# Patient Record
Sex: MALE | Race: WHITE | NOT HISPANIC OR LATINO | Employment: OTHER | URBAN - METROPOLITAN AREA
[De-identification: names, ages, dates, MRNs, and addresses within clinical notes are randomized per-mention and may not be internally consistent; named-entity substitution may affect disease eponyms.]

---

## 2017-03-17 ENCOUNTER — COMMUNICATION - HEALTHEAST (OUTPATIENT)
Dept: SURGERY | Facility: CLINIC | Age: 37
End: 2017-03-17

## 2017-04-07 ENCOUNTER — OFFICE VISIT - HEALTHEAST (OUTPATIENT)
Dept: SURGERY | Facility: CLINIC | Age: 37
End: 2017-04-07

## 2017-04-07 DIAGNOSIS — G89.18 POST-OP PAIN: ICD-10-CM

## 2017-04-07 ASSESSMENT — MIFFLIN-ST. JEOR: SCORE: 2010.78

## 2017-06-01 ENCOUNTER — COMMUNICATION - HEALTHEAST (OUTPATIENT)
Dept: INTERNAL MEDICINE | Facility: CLINIC | Age: 37
End: 2017-06-01

## 2017-06-06 ENCOUNTER — OFFICE VISIT - HEALTHEAST (OUTPATIENT)
Dept: INTERNAL MEDICINE | Facility: CLINIC | Age: 37
End: 2017-06-06

## 2017-06-06 DIAGNOSIS — Z01.818 PREOP EXAM FOR INTERNAL MEDICINE: ICD-10-CM

## 2017-06-06 DIAGNOSIS — R79.89 ABNORMAL LFTS: ICD-10-CM

## 2017-06-06 ASSESSMENT — MIFFLIN-ST. JEOR: SCORE: 2000.13

## 2017-06-07 ENCOUNTER — ANESTHESIA - HEALTHEAST (OUTPATIENT)
Dept: SURGERY | Facility: HOSPITAL | Age: 37
End: 2017-06-07

## 2017-06-07 LAB — HCV AB SERPL QL IA: NEGATIVE

## 2017-06-08 ENCOUNTER — SURGERY - HEALTHEAST (OUTPATIENT)
Dept: SURGERY | Facility: HOSPITAL | Age: 37
End: 2017-06-08

## 2017-07-03 ENCOUNTER — OFFICE VISIT - HEALTHEAST (OUTPATIENT)
Dept: SURGERY | Facility: CLINIC | Age: 37
End: 2017-07-03

## 2017-07-03 DIAGNOSIS — R10.31 RIGHT LOWER QUADRANT ABDOMINAL PAIN: ICD-10-CM

## 2017-07-20 ENCOUNTER — OFFICE VISIT - HEALTHEAST (OUTPATIENT)
Dept: SURGERY | Facility: CLINIC | Age: 37
End: 2017-07-20

## 2017-07-24 ENCOUNTER — RECORDS - HEALTHEAST (OUTPATIENT)
Dept: ADMINISTRATIVE | Facility: OTHER | Age: 37
End: 2017-07-24

## 2018-05-02 ENCOUNTER — OFFICE VISIT - HEALTHEAST (OUTPATIENT)
Dept: INTERNAL MEDICINE | Facility: CLINIC | Age: 38
End: 2018-05-02

## 2018-05-02 DIAGNOSIS — R10.33 PERIUMBILICAL PAIN: ICD-10-CM

## 2018-05-02 ASSESSMENT — MIFFLIN-ST. JEOR: SCORE: 2057.73

## 2018-05-07 ENCOUNTER — COMMUNICATION - HEALTHEAST (OUTPATIENT)
Dept: UROLOGY | Facility: CLINIC | Age: 38
End: 2018-05-07

## 2018-05-15 ENCOUNTER — COMMUNICATION - HEALTHEAST (OUTPATIENT)
Dept: UROLOGY | Facility: CLINIC | Age: 38
End: 2018-05-15

## 2019-02-22 ENCOUNTER — RECORDS - HEALTHEAST (OUTPATIENT)
Dept: ADMINISTRATIVE | Facility: OTHER | Age: 39
End: 2019-02-22

## 2019-02-22 ENCOUNTER — HOSPITAL ENCOUNTER (OUTPATIENT)
Dept: ULTRASOUND IMAGING | Facility: CLINIC | Age: 39
Discharge: HOME | End: 2019-02-22

## 2019-02-22 DIAGNOSIS — N50.9 DISORDER OF MALE GENITAL ORGANS, UNSPECIFIED: ICD-10-CM

## 2019-02-25 ENCOUNTER — RECORDS - HEALTHEAST (OUTPATIENT)
Dept: ADMINISTRATIVE | Facility: OTHER | Age: 39
End: 2019-02-25

## 2019-03-22 ENCOUNTER — COMMUNICATION - HEALTHEAST (OUTPATIENT)
Dept: NURSING | Facility: CLINIC | Age: 39
End: 2019-03-22

## 2019-09-30 ENCOUNTER — RECORDS - HEALTHEAST (OUTPATIENT)
Dept: ADMINISTRATIVE | Facility: OTHER | Age: 39
End: 2019-09-30

## 2021-05-26 NOTE — PROGRESS NOTES
Called Patient to see if he has a PCP outside of [facility] and if not if they would like to set up an appointment to establish care with a Provider with [facility] unable to leave a voice mail.  If this patient is returning my call, please transfer to [facility] at ext [phone].      Criselda Almanza   [phone]  Clinic Care Coordinator

## 2021-05-27 ENCOUNTER — RECORDS - HEALTHEAST (OUTPATIENT)
Dept: ADMINISTRATIVE | Facility: CLINIC | Age: 41
End: 2021-05-27

## 2021-05-30 VITALS — HEIGHT: 71 IN | BODY MASS INDEX: 33.29 KG/M2 | WEIGHT: 237.8 LBS

## 2021-05-31 ENCOUNTER — HOSPITAL ENCOUNTER (EMERGENCY)
Dept: EMERGENCY MEDICINE | Facility: CLINIC | Age: 41
Discharge: HOME | End: 2021-05-31
Attending: EMERGENCY MEDICINE
Payer: COMMERCIAL

## 2021-05-31 VITALS — HEIGHT: 70 IN | BODY MASS INDEX: 33.97 KG/M2 | WEIGHT: 237.3 LBS

## 2021-05-31 VITALS — WEIGHT: 237 LBS | BODY MASS INDEX: 33.05 KG/M2

## 2021-05-31 DIAGNOSIS — N20.1 URETERAL STONE: ICD-10-CM

## 2021-05-31 LAB
ALBUMIN SERPL-MCNC: 4.6 G/DL (ref 3.5–5)
ALP SERPL-CCNC: 108 U/L (ref 45–120)
ALT SERPL W P-5'-P-CCNC: 23 U/L (ref 0–45)
ANION GAP SERPL CALCULATED.3IONS-SCNC: 13 MMOL/L (ref 5–18)
AST SERPL W P-5'-P-CCNC: 14 U/L (ref 0–40)
BILIRUB DIRECT SERPL-MCNC: 0.2 MG/DL
BILIRUB SERPL-MCNC: 0.9 MG/DL (ref 0–1)
BUN SERPL-MCNC: 10 MG/DL (ref 8–22)
C REACTIVE PROTEIN LHE: 0.1 MG/DL (ref 0–0.8)
CALCIUM SERPL-MCNC: 9.6 MG/DL (ref 8.5–10.5)
CHLORIDE BLD-SCNC: 103 MMOL/L (ref 98–107)
CO2 SERPL-SCNC: 25 MMOL/L (ref 22–31)
CREAT SERPL-MCNC: 1.17 MG/DL (ref 0.7–1.3)
ERYTHROCYTE [DISTWIDTH] IN BLOOD BY AUTOMATED COUNT: 12.3 % (ref 11–14.5)
GFR SERPL CREATININE-BSD FRML MDRD: >60 ML/MIN/1.73M2
GLUCOSE BLD-MCNC: 127 MG/DL (ref 70–125)
HCT VFR BLD AUTO: 49.6 % (ref 40–54)
HGB BLD-MCNC: 16.4 G/DL (ref 14–18)
LACTATE SERPL-SCNC: 2.1 MMOL/L (ref 0.7–2)
LIPASE SERPL-CCNC: 27 U/L (ref 0–52)
MCH RBC QN AUTO: 28.5 PG (ref 27–34)
MCHC RBC AUTO-ENTMCNC: 33.1 G/DL (ref 32–36)
MCV RBC AUTO: 86 FL (ref 80–100)
PLATELET # BLD AUTO: 330 THOU/UL (ref 140–440)
PMV BLD AUTO: 11.4 FL (ref 8.5–12.5)
POTASSIUM BLD-SCNC: 4.1 MMOL/L (ref 3.5–5)
PROT SERPL-MCNC: 7.6 G/DL (ref 6–8)
RBC # BLD AUTO: 5.76 MILL/UL (ref 4.4–6.2)
SODIUM SERPL-SCNC: 141 MMOL/L (ref 136–145)
WBC: 13.1 THOU/UL (ref 4–11)

## 2021-05-31 ASSESSMENT — MIFFLIN-ST. JEOR: SCORE: 1959.52

## 2021-06-01 ENCOUNTER — COMMUNICATION - HEALTHEAST (OUTPATIENT)
Dept: UROLOGY | Facility: CLINIC | Age: 41
End: 2021-06-01

## 2021-06-01 VITALS — BODY MASS INDEX: 35.79 KG/M2 | WEIGHT: 250 LBS | HEIGHT: 70 IN

## 2021-06-02 ENCOUNTER — COMMUNICATION - HEALTHEAST (OUTPATIENT)
Dept: UROLOGY | Facility: CLINIC | Age: 41
End: 2021-06-02

## 2021-06-05 ENCOUNTER — HOSPITAL ENCOUNTER (EMERGENCY)
Dept: EMERGENCY MEDICINE | Facility: CLINIC | Age: 41
Discharge: HOME | End: 2021-06-05
Payer: COMMERCIAL

## 2021-06-05 DIAGNOSIS — N20.1 URETERAL STONE: ICD-10-CM

## 2021-06-05 LAB
ALBUMIN SERPL-MCNC: 3.9 G/DL (ref 3.5–5)
ALP SERPL-CCNC: 129 U/L (ref 45–120)
ALT SERPL W P-5'-P-CCNC: 61 U/L (ref 0–45)
ANION GAP SERPL CALCULATED.3IONS-SCNC: 9 MMOL/L (ref 5–18)
AST SERPL W P-5'-P-CCNC: 16 U/L (ref 0–40)
BASOPHILS # BLD AUTO: 0.1 THOU/UL (ref 0–0.2)
BASOPHILS NFR BLD AUTO: 1 % (ref 0–2)
BILIRUB SERPL-MCNC: 0.8 MG/DL (ref 0–1)
BUN SERPL-MCNC: 12 MG/DL (ref 8–22)
C REACTIVE PROTEIN LHE: 0.3 MG/DL (ref 0–0.8)
CALCIUM SERPL-MCNC: 8.6 MG/DL (ref 8.5–10.5)
CHLORIDE BLD-SCNC: 107 MMOL/L (ref 98–107)
CO2 SERPL-SCNC: 25 MMOL/L (ref 22–31)
CREAT SERPL-MCNC: 0.87 MG/DL (ref 0.7–1.3)
EOSINOPHIL # BLD AUTO: 0.4 THOU/UL (ref 0–0.4)
EOSINOPHIL NFR BLD AUTO: 5 % (ref 0–6)
ERYTHROCYTE [DISTWIDTH] IN BLOOD BY AUTOMATED COUNT: 12.4 % (ref 11–14.5)
GFR SERPL CREATININE-BSD FRML MDRD: >60 ML/MIN/1.73M2
GLUCOSE BLD-MCNC: 101 MG/DL (ref 70–125)
HCT VFR BLD AUTO: 47.5 % (ref 40–54)
HGB BLD-MCNC: 15.7 G/DL (ref 14–18)
IMM GRANULOCYTES # BLD: 0.1 THOU/UL
IMM GRANULOCYTES NFR BLD: 1 %
LYMPHOCYTES # BLD AUTO: 2.1 THOU/UL (ref 0.8–4.4)
LYMPHOCYTES NFR BLD AUTO: 25 % (ref 20–40)
MCH RBC QN AUTO: 27.7 PG (ref 27–34)
MCHC RBC AUTO-ENTMCNC: 33.1 G/DL (ref 32–36)
MCV RBC AUTO: 84 FL (ref 80–100)
MONOCYTES # BLD AUTO: 0.9 THOU/UL (ref 0–0.9)
MONOCYTES NFR BLD AUTO: 11 % (ref 2–10)
NEUTROPHILS # BLD AUTO: 4.9 THOU/UL (ref 2–7.7)
NEUTROPHILS NFR BLD AUTO: 58 % (ref 50–70)
PLATELET # BLD AUTO: 291 THOU/UL (ref 140–440)
PMV BLD AUTO: 11.2 FL (ref 8.5–12.5)
POTASSIUM BLD-SCNC: 4.3 MMOL/L (ref 3.5–5)
PROT SERPL-MCNC: 7.1 G/DL (ref 6–8)
RBC # BLD AUTO: 5.66 MILL/UL (ref 4.4–6.2)
SODIUM SERPL-SCNC: 141 MMOL/L (ref 136–145)
WBC: 8.4 THOU/UL (ref 4–11)

## 2021-06-07 ENCOUNTER — COMMUNICATION - HEALTHEAST (OUTPATIENT)
Dept: UROLOGY | Facility: CLINIC | Age: 41
End: 2021-06-07

## 2021-06-08 ENCOUNTER — COMMUNICATION - HEALTHEAST (OUTPATIENT)
Dept: UROLOGY | Facility: CLINIC | Age: 41
End: 2021-06-08

## 2021-06-09 ENCOUNTER — HOSPITAL ENCOUNTER (EMERGENCY)
Dept: EMERGENCY MEDICINE | Facility: CLINIC | Age: 41
Discharge: HOME | End: 2021-06-09
Admitting: EMERGENCY MEDICINE
Payer: COMMERCIAL

## 2021-06-09 ENCOUNTER — OFFICE VISIT - HEALTHEAST (OUTPATIENT)
Dept: UROLOGY | Facility: CLINIC | Age: 41
End: 2021-06-09

## 2021-06-09 DIAGNOSIS — N13.2 HYDRONEPHROSIS WITH URINARY OBSTRUCTION DUE TO URETERAL CALCULUS: ICD-10-CM

## 2021-06-09 DIAGNOSIS — R10.9 FLANK PAIN: ICD-10-CM

## 2021-06-09 DIAGNOSIS — N20.0 CALCULUS OF KIDNEY: ICD-10-CM

## 2021-06-09 DIAGNOSIS — N23 RENAL COLIC: ICD-10-CM

## 2021-06-09 DIAGNOSIS — R11.0 NAUSEA: ICD-10-CM

## 2021-06-09 DIAGNOSIS — N20.1 CALCULUS OF URETER: ICD-10-CM

## 2021-06-09 LAB
ALBUMIN UR-MCNC: NEGATIVE G/DL
ANION GAP SERPL CALCULATED.3IONS-SCNC: 8 MMOL/L (ref 5–18)
APPEARANCE UR: CLEAR
BILIRUB UR QL STRIP: NEGATIVE
BUN SERPL-MCNC: 11 MG/DL (ref 8–22)
C REACTIVE PROTEIN LHE: 0.3 MG/DL (ref 0–0.8)
CALCIUM SERPL-MCNC: 8.7 MG/DL (ref 8.5–10.5)
CHLORIDE BLD-SCNC: 106 MMOL/L (ref 98–107)
CO2 SERPL-SCNC: 25 MMOL/L (ref 22–31)
COLOR UR AUTO: NORMAL
CREAT SERPL-MCNC: 0.76 MG/DL (ref 0.7–1.3)
ERYTHROCYTE [DISTWIDTH] IN BLOOD BY AUTOMATED COUNT: 12.3 % (ref 11–14.5)
GFR SERPL CREATININE-BSD FRML MDRD: >60 ML/MIN/1.73M2
GLUCOSE BLD-MCNC: 94 MG/DL (ref 70–125)
GLUCOSE UR STRIP-MCNC: NEGATIVE MG/DL
HCT VFR BLD AUTO: 42.4 % (ref 40–54)
HGB BLD-MCNC: 14.4 G/DL (ref 14–18)
HGB UR QL STRIP: NEGATIVE
KETONES UR STRIP-MCNC: NEGATIVE MG/DL
LEUKOCYTE ESTERASE UR QL STRIP: NEGATIVE
MCH RBC QN AUTO: 28.1 PG (ref 27–34)
MCHC RBC AUTO-ENTMCNC: 34 G/DL (ref 32–36)
MCV RBC AUTO: 83 FL (ref 80–100)
NITRATE UR QL: NEGATIVE
PH UR STRIP: 5.5 [PH] (ref 5–8)
PLATELET # BLD AUTO: 287 THOU/UL (ref 140–440)
PMV BLD AUTO: 11.2 FL (ref 8.5–12.5)
POTASSIUM BLD-SCNC: 3.4 MMOL/L (ref 3.5–5)
RBC # BLD AUTO: 5.13 MILL/UL (ref 4.4–6.2)
SODIUM SERPL-SCNC: 139 MMOL/L (ref 136–145)
SP GR UR STRIP: 1.02 (ref 1–1.03)
UROBILINOGEN UR STRIP-ACNC: NORMAL
WBC: 10.8 THOU/UL (ref 4–11)

## 2021-06-09 ASSESSMENT — MIFFLIN-ST. JEOR: SCORE: 1839.33

## 2021-06-09 NOTE — PROGRESS NOTES
HPI:  Jayda Ragland is a 36 y.o. male who returns to see me for persistent right-sided abdominal pain.  He underwent open umbilical hernia repair almost 1 year ago without any consultations.  However, he describes a right area umbilical discomfort which is a tugging sensation which has persisted over the years since his surgery.  He states the pain is identical to the pain he had prior to surgery as well.  He describes it as a throbbing muscle ache with a tugging sensation on the inside of his abdomen which stops him and his tracts and he is unable to function properly.  He denies any recurrent umbilical bulges, fevers, chills, nausea or emesis.  He has had multiple visits to the emergency room for evaluation of his bowel pain and has undergone multiple CT scans all of which do not demonstrate any cause of his abdominal pain.  Allergies:Penicillins    Past Medical History:   Diagnosis Date     Abdominal hernia        Past Surgical History:   Procedure Laterality Date     CHOLECYSTECTOMY  2014     HERNIA REPAIR         CURRENT MEDS:  Current Outpatient Prescriptions   Medication Sig Dispense Refill     ibuprofen (ADVIL,MOTRIN) 200 MG tablet Take 200 mg by mouth every 6 (six) hours as needed for pain.       traMADol (ULTRAM) 50 mg tablet Take 1 tablet (50 mg total) by mouth every 6 (six) hours as needed for pain. 30 tablet 0     No current facility-administered medications for this visit.        No family history on file.     reports that he has quit smoking. His smokeless tobacco use includes Chew. He reports that he drinks alcohol. He reports that he does not use illicit drugs.    Review of Systems -   The 12 point review of systems  is within normal limits except for as mentioned above in the HPI.  General ROS: No complaints or constitutional symptoms  Ophthalmic ROS: No complaints of visual changes  Skin: No complaints or symptoms   Endocrine: No complaints or symptoms  Hematologic/Lymphatic: No symptoms or  "complaints  Psychiatric: No symptoms or complaints  Respiratory ROS: no cough, shortness of breath, or wheezing  Cardiovascular ROS: no chest pain or dyspnea on exertion  Gastrointestinal ROS: As per HPI  Genito-Urinary ROS: no dysuria, trouble voiding, or hematuria  Musculoskeletal ROS: no joint or muscle pain  Neurological ROS: no TIA or stroke symptoms      /78 (Patient Site: Right Arm, Patient Position: Sitting, Cuff Size: Adult Large)  Pulse 86  Ht 5' 11\" (1.803 m)  Wt (!) 237 lb 12.8 oz (107.9 kg)  SpO2 98%  BMI 33.17 kg/m2  Body mass index is 33.17 kg/(m^2).    EXAM:  GENERAL: Well developed male  HEENT: Extra ocular muscles intact, pupils are round and reactive, sclera is anicteric,   NECK:  No obvious masses or deformities  CARDIAC: RRR w/out murmur   CHEST/LUNG: Clear to auscultation bilaterally  ABDOMEN: Soft, no obvious recurrent umbilical hernias, tender to palpation proximally 6 cm to the right of the midline at the level of the umbilicus with no palpable masses  NEURO: No obvious defects noted.  EXT: No edema, no obvious deformities or any other abnormalities    IMAGES:   CT ABDOMEN PELVIS WO ORAL W IV CONTRAST  4/3/2017 9:29 PM      INDICATION: pain, hx hernia  TECHNIQUE: CT abdomen and pelvis. Multiplanar reformation images (MPR). Dose reduction techniques were used.   IV CONTRAST: Iohexol (Omni) 100 mL  COMPARISON: 3/13/2017 and multiple prior studies.     FINDINGS:  LUNG BASES: Unremarkable.     ABDOMEN: The liver, spleen, adjacent splenule, pancreas, adrenal glands and right kidney are unremarkable. Small left renal cyst again noted. The gallbladder is been removed. Aorta is normal caliber. No adenopathy.      No change in the postoperative appearance of supra umbilical ventral hernia repair with mesh. There is minimal stranding anterior to the repair. Minimal stranding of the intra-abdominal l fat along the inferior margin of the mesh is also unchanged   (images 69-72). Inferior to " this, there is a 4 mm umbilical hernia containing only fat.     PELVIS: Moderate amount retained stool in the colon. No free fluid or inflammatory changes. Appendix not seen.     MUSCULOSKELETAL: No suspicious lesions.     IMPRESSION:   CONCLUSION:  1. Stable appearance to the site of a supraumbilical ventral hernia repair with mesh. There is minimal stranding that is unchanged since the most recent study and much less than on the 2016 exam.  2. There is a 4 mm tiny umbilical hernia containing only fat.  3. No abnormalities are seen to explain pain.  4. Prior cholecystectomy.  5. Stable left renal cyst.    Assessment/Plan:    Jayda Ragland is a 36 y.o. male with abdominal pain of an unclear etiology.  I do not think this is attributed to his tiny 4 mm umbilical hernia.  His abdominal pain is fairly lateral to the site of the surgery and his mesh does not reach that far.  Additionally, this pain is identical to the pain he had prior to surgery which leads me to believe that there may be some intra-abdominal scar tissue/adhesions from his previous surgery which may be the cause of this discomfort.  Reviewed the CT scan with him and it had an extensive extension discussion regarding the possibilities of intra-abdominal adhesions versus other findings.  After much discussion the plan will be for diagnostic laparoscopy and possibly lysis of adhesions.  He understands the risks and benefits of the procedure and has desired to proceed.    Everardo Graham D.O. [facility]  [phone]  [facility] Department of Surgery

## 2021-06-11 NOTE — ANESTHESIA CARE TRANSFER NOTE
Last vitals:   Vitals:    06/08/17 0951   BP: (!) 176/98   Pulse: 92   Resp: 14   Temp: 36.4  C (97.6  F)   SpO2: 97%     Patient's level of consciousness is drowsy  Spontaneous respirations: yes  Maintains airway independently: yes  Dentition unchanged: yes  Oropharynx: oropharynx clear of all foreign objects    QCDR Measures:  ASA# 20 - Surgical Safety Checklist: ASA20A - Safety Checks Done  PQRS# 430 - Adult PONV Prevention: 4558F - Pt received => 2 anti-emetic agents (different classes) preop & intraop  ASA# 8 - Peds PONV Prevention: NA - Not pediatric patient, not GA or 2 or more risk factors NOT present  PQRS# 424 - Marine-op Temp Management: 4559F - At least one body temp DOCUMENTED => 35.5C or 95.9F within required timeframe  PQRS# 426 - PACU Transfer Protocol: - Transfer of care checklist used  ASA# 14 - Acute Post-op Pain: ASA14B - Patient did NOT experience pain >= 7 out of 10

## 2021-06-11 NOTE — ANESTHESIA POSTPROCEDURE EVALUATION
Patient: Jayda Ragland  DIAGNOSTIC LAPAROSCOPY, EXPLANTATION OF UMBILICAL MESH  Anesthesia type: general    Patient location: PACU  Last vitals:   Vitals:    06/08/17 1100   BP: 138/72   Pulse: 68   Resp: 20   Temp:    SpO2: 95%     Post vital signs: stable  Level of consciousness: alert and conversant  Post-anesthesia pain: pain controlled  Post-anesthesia nausea and vomiting: no  Pulmonary: supplemental oxygen when seen  Cardiovascular: stable and blood pressure at baseline  Hydration: adequate  Anesthetic events: no    QCDR Measures:  ASA# 11 - Marine-op Cardiac Arrest: ASA11B - Patient did NOT experience unanticipated cardiac arrest  ASA# 12 - Marine-op Mortality Rate: ASA12B - Patient did NOT die  ASA# 13 - PACU Re-Intubation Rate: ASA13B - Patient did NOT require a new airway mgmt  ASA# 10 - Composite Anes Safety: ASA10A - No serious adverse event  ASA# 38 - New Corneal Injury: ASA38A - No new exposure keratitis or corneal abrasion in PACU    Additional Notes:

## 2021-06-11 NOTE — PROGRESS NOTES
Jayda Ragland is status post laparoscopic lysis of adhesions and umbilical hernia mesh explantation.  He is doing well from a postsurgical standpoint but states that the pain that he had initially prior to any type of surgery which includes the right lower abdominal and right testicular discomfort has recurred.  He states that he has had an epididymal cyst in the past which, when enlarged, causes worse discomfort.  EXAM:  /87 (Patient Site: Left Arm, Patient Position: Sitting, Cuff Size: Adult Large)  Pulse 85  SpO2 97%  GENERAL: Well developed male, No acute distress, pleasant and conversant   EYES: Pupils equal, round and reactive, no scleral icterus  ABDOMEN: Well-healed port site incisions, no umbilical hernia, no evidence of any right inguinal hernias, tender spermatic cord with fibrous thickening  SKIN: Pink, warm and dry, no obvious rashes or lesions   NEURO:No focal deficits, ambulatory  MUSCULOSKELETAL:No obvious deformities, no swelling, normal appearing      ASSESSMENT AND PLAN:  Jayda Ragland continues to have right mid abdominal pain with radiation from and to the testicle.  I am not clear on the etiology of this discomfort.  He is fairly tender around the spermatic cord but I was unable to appreciate any hernias.  Additionally, his right spermatic cord is slightly indurated and there may be a component of chronic inflammation.  In any event, I will have him referred to urology for evaluation.  I will obtain an ultrasound of the testicles as well as the right inguinal region to rule out an occult hernia.  Additionally, I will refer him to primary care as he is requesting to establish a relationship with a primary care doctor to manage his other medical issues.    Everardo Graham D.O. [facility]  [phone]  [facility] Department of Surgery

## 2021-06-11 NOTE — PROGRESS NOTES
ASSESSMENT:  1. Preop exam for internal medicine  No contraindication for the surgical procedure.  - HM2(CBC w/o Differential)    2. Abnormal LFTs  Likely fatty infiltration but will check labs again and hep C.  No obvious risk factor for hep C at this time.  - Comprehensive Metabolic Panel  - Hepatitis C Antibody (Anti-HCV)    PLAN:  Patient Instructions   Do not take aspirin, ibuprofen, or Aleve between now and surgery.    Do not take tramadol after midnight the night before surgery.       Orders Placed This Encounter   Procedures     HM2(CBC w/o Differential)     Comprehensive Metabolic Panel     Hepatitis C Antibody (Anti-HCV)     Medications Discontinued During This Encounter   Medication Reason     ibuprofen (ADVIL,MOTRIN) 200 MG tablet        Return if symptoms worsen or fail to improve.    ASSESSED PROBLEMS:  Problem List Items Addressed This Visit     None      Visit Diagnoses     Preop exam for internal medicine    -  Primary    Relevant Orders    HM2(CBC w/o Differential)    Abnormal LFTs        Relevant Orders    Comprehensive Metabolic Panel    Hepatitis C Antibody (Anti-HCV)          CHIEF COMPLAINT:  Chief Complaint   Patient presents with     Pre-op Exam     hernia repair - [facility] on 06/08/2017       HISTORY OF PRESENT ILLNESS:  Jayda Ragland is a 36 y.o. male here for an internal medicine pre-operative consultation. The exam is requested by Dr. Graham in preparation for diagnostic laparoscopy for abdominal pain to be performed at [facility] on 06/08/2017. Today s examination on 6/6/2017 is done to review the underlying surgical condition of painful umbilical hernia repair, clear for anesthesia, and review medical problems with appropriate changes in medications.    He states that he had a hernia repair completed in the past and currently has mesh in his abdomen. He was seen at the ER on 4/07/17 for abdominal pain; multiple CT scans did not offer possible source of abdominal pain. He is having  surgery completed in order to hopefully determine a cause for his continued abdominal pain. He has been taking ibuprofen the last couple of days; he has been alternating ibuprofen and Tylenol with his tramadol.     Jayda Ragland has tolerated previous surgeries well without bleeding or anesthesia difficulty.     Past/Current Medical Problems:  Previous bleeding disorders: Negative    History of anesthesia reactions: Negative    Past Medical History:   Diagnosis Date     Abdominal hernia        REVIEW OF SYSTEMS:  He denies any increased risk for hepatitis C due to exposure to IV needles, risky sex, or blood transfusions. He denies drinking any excessive alcohol, sleep apnea, heart disease, or asthma.     Constitutional: Negative  Eyes: Negative  ENT: Negative  Respiratory: Negative  Breast/Skin: Negative  Cardiovascular:Negative   GI: Negative  Urinary: Negative  Reproductive: Negative  Musculoskeletal: hernia   Neuro: Negative  Endocrine: Negative  Mental Health: Negative   Lymph: Negative  Heme: Negative   Skin: Sebaceous cyst on back.     Remaining Review of Systems negative.    QUESTIONS/HISTORY PERTINENT TO PRE-OP:  Body Piercings: None    Family history of bleeding disorders: Negative    Family history of anesthesia reactions: Negative    Hospitalizations/ Year:  Multiple ED visits.   Associated with surgical history below.     Surgeries:  Past Surgical History:   Procedure Laterality Date     CHOLECYSTECTOMY  2014     HERNIA REPAIR         Family History:   History reviewed. No pertinent family history.    Social History:  Social History     Social History     Marital status: Single     Spouse name: N/A     Number of children: N/A     Years of education: N/A     Occupational History     Not on file.     Social History Main Topics     Smoking status: Former Smoker     Smokeless tobacco: Current User     Types: Chew      Comment: quit x 5 months     Alcohol use Yes     Drug use: No     Sexual activity: Not  "on file     Other Topics Concern     Not on file     Social History Narrative    Patient is employed at [employer]        VITALS:  Vitals:    06/06/17 1632   BP: 124/62   Pulse: 63   Weight: (!) 237 lb 4.8 oz (107.6 kg)   Height: 5' 10.47\" (1.79 m)     Wt Readings from Last 3 Encounters:   06/06/17 (!) 237 lb 4.8 oz (107.6 kg)   05/17/17 (!) 245 lb (111.1 kg)   04/07/17 (!) 237 lb 12.8 oz (107.9 kg)     Body mass index is 33.59 kg/(m^2).    PHYSICAL EXAM:  General Appearance: Alert, cooperative, no distress, appears stated age.  Neck: Supple without adenopathy or thyromegaly.  Back: No CVA tenderness or spinous process pain.  Lungs: Clear to auscultation bilaterally, good air movement.  Heart: Regular rate and rhythm, S1 and S2 normal, no murmur or bruit.  Abdomen: Soft, non-tender, no HSM or masses. Surgical scar just above umbilicus.   Musculoskeletal: No gross abnormalities.  Extremities: No CCE, pulses II/IV and symmetric.   Skin: No worrisome lesions noted.  Lymph nodes: Cervical, supraclavicular, groin, and axillary nodes normal.  Neurologic: CNII-XII intact, strength appeared normal, sensory grossly intact.  Psychiatric:  He has a normal mood and affect.       Notes Reviewed, additional history from source other than patient (2 TOTAL): Reviewed ER note from 4/7/17; chronic abdominal pain, multiple CT scans have not shown cause.     Accessed Care Everywhere, Requested Records, Consult with Physician (1 TOTAL): None.     Radiology tests summarized or ordered (XR, CT, MRI, DXA, US) (1 TOTAL): None.    Labs reviewed or ordered (1 TOTAL): Reviewed labs from 4/7/17; HM2, CMP, lactic acid. Ordered labs today.     Medicine tests reviewed or ordered (ECG, echocardiogram, colonoscopy, EGD, venous US) (1 TOTAL): None.    Independent review of ECG or XR (2 EACH): None.      The visit lasted a total of 7 minutes face to face with the patient. Over 50% of the time was spent counseling and educating the " patient about surgical preparation.    I, Jo Razo, am scribing for and in the presence of, Dr. Paz.    I, Dr. Paz, personally performed the services described in this documentation, as scribed by Jo Razo in my presence, and it is both accurate and complete.    MEDICATIONS:  Current Outpatient Prescriptions   Medication Sig Dispense Refill     traMADol (ULTRAM) 50 mg tablet Take 1 tablet (50 mg total) by mouth every 6 (six) hours as needed for pain. 40 tablet 0     No current facility-administered medications for this visit.        ALLERGIES:  Allergies   Allergen Reactions     Penicillins Rash       Total data points: 3

## 2021-06-11 NOTE — ANESTHESIA PREPROCEDURE EVALUATION
Anesthesia Evaluation      Patient summary reviewed   No history of anesthetic complications     Airway   Mallampati: I  Neck ROM: full   Pulmonary - negative ROS and normal exam                          Cardiovascular - negative ROS and normal exam   Neuro/Psych - negative ROS     Endo/Other - negative ROS      GI/Hepatic/Renal - negative ROS      Other findings: H/o abnl LFTs, possibly from fatty liver, levels nl on recent tests. Has abdominal pain, possibly related to repair of umbilical hernia with mesh.  Obese, BMI 33.  Hg 15.7, K 4.3, Cr 0.86.      Dental - normal exam                        Anesthesia Plan  Planned anesthetic: general endotracheal    ASA 1   Induction: intravenous   Anesthetic plan and risks discussed with: patient    Post-op plan: routine recovery

## 2021-06-12 NOTE — PROGRESS NOTES
HPI: Patient has returned to clinic for a prescription for tramadol. He is post-op 6 weeks and states that this pain is the same pain he was having before surgery. He denies any new injury, bulges, redness, or drainage. He has not seen a urologist or chosen a primary care provider, which were the recommendations of Dr. Graham on 7/03/2017. Patient wants a prescription for pain medicine.      /85 (Patient Site: Right Arm, Patient Position: Sitting, Cuff Size: Adult Large)  Pulse 82  SpO2 96%    EXAM:  GENERAL:Appears well, NAD  ABDOMEN:  Soft, +BS, nontender, no palpable masses or hernias  : palpable scrotal cyst that when manipulated radiates pain towards abdomen with a sensation of pulling.  SURGICAL WOUNDS:  Incisions healed    Assessment/Plan: Patient states he is requiring tramadol for pain regularly and he only came to have a prescription called in for him. I explained to the patient that he is not having surgical pain and needs to be seen by a primary care provider and urology. I was not willing to prescribe him pain medication this far out from surgery. I also do not believe this pain is surgical.    Lucero Sears , [facility] Surgery

## 2021-06-16 PROBLEM — F41.9 ANXIETY: Status: ACTIVE | Noted: 2019-02-26

## 2021-06-16 PROBLEM — R44.0 AUDITORY HALLUCINATIONS: Status: ACTIVE | Noted: 2019-02-26

## 2021-06-16 PROBLEM — F39 MOOD DISORDER (H): Status: ACTIVE | Noted: 2019-02-26

## 2021-06-16 PROBLEM — N20.0 KIDNEY STONE: Status: ACTIVE | Noted: 2018-11-09

## 2021-06-16 PROBLEM — E66.9 OBESITY (BMI 30-39.9): Status: ACTIVE | Noted: 2019-02-11

## 2021-06-17 NOTE — PROGRESS NOTES
ASSESSMENT/PLAN:  1. Periumbilical pain  I am seeing this patient for some reason because he was set up to see me related to his persistent symptoms in the abdomen and him wanting to be seen by pain clinic or get a medication.  He requested tramadol.  I refused based on needing to follow-up with the surgeon who took care of him during this time and sort out if there is some scar tissue or other thing that can be beneficial.  I believe complaint pain clinic is likely to be effective either unless they can inject something.  I am not sure what they would inject in this area safely.  Therefore the best option is for the patient to see the surgeon again to discuss whether or not he has some sort of adhesion scar tissue etc. that are causing his persistent symptoms around his umbilicus with his extreme exertion lifting [place] etc. and what can be done about this.  For the time being I suggested ibuprofen 600 mg 3 times daily.    Patient was rather confused about where he had had and what kinds of different tests he had relating to this.  This makes it difficult to try to help especially for clinician like myself was not seen the patient except for preop last year.  Is not my primary patient.  He should see the surgeon.      Patient Instructions   Recommend going back to see Dr. Graham - [phone]    You may have a slight diastasis recti, if you want to research that.     You have a small umbilical hernia seen on CT scan from April 2017, but this should not be causing your symptoms.     Ibuprofen 600 mg three times daily.         No Follow-up on file.    CHIEF COMPLAINT:  Chief Complaint   Patient presents with     would like to discuss pain clinic     above his belly button - hernia pain       HISTORY OF PRESENT ILLNESS:  Jayda Ragland is a 37 y.o. male presenting to the clinic today with complaints of abdominal pain.     Abdominal Pain: He had an umbilical hernia surgically repaired about two years ago, but the  "mesh was bothersome, so he had it removed last year. He states the hernia has since recurred and that it has been quite painful. His story is quite confusing as he states he has had imaging done within the past few months within the [facility] system, but we do not appear to have these on file. He has not gone back to see his surgeon, Dr. Graham, since last year. He always has pain proximal to his navel in the area of his previous surgeries now. He has been taking a couple of ibuprofen and Tylenol daily for the pain, but that has not been as helpful lately. He inquires if he could have a prescription of tramadol. 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 He experiences a pulling sensation in his abdomen that goes down towards his groin into his right testicle.     REVIEW OF SYSTEMS:   He experiences a pain and pulling in his right testicle. He thinks he has an epididymal cyst. Comprehensive review of systems negative except as noted above.    PFSH:  Reviewed, as below.     TOBACCO USE:  History   Smoking Status     Former Smoker   Smokeless Tobacco     Former User     Types: Chew       VITALS:  Vitals:    05/02/18 0846   BP: 110/78   Pulse: 72   Weight: (!) 250 lb (113.4 kg)   Height: 5' 10.47\" (1.79 m)     Wt Readings from Last 3 Encounters:   05/02/18 (!) 250 lb (113.4 kg)   07/03/17 (!) 240 lb (108.9 kg)   06/23/17 (!) 240 lb (108.9 kg)     Body mass index is 35.39 kg/(m^2).    PHYSICAL EXAM:  General Appearance: Alert, cooperative, no distress, appears stated age.  Abdomen: Slight diastasis recti, no obvious herniation, no significant tenderness in the area.   Psychiatric: he has a normal mood and affect.     Notes Reviewed, additional history from source other than patient (2 TOTAL): Reviewed 7/3/2017 Dr. Graham note regarding hernia and abdominal pain.     Accessed Care Everywhere, Requested Records, Consult with Physician (1 TOTAL): None.     Radiology tests " summarized or ordered (XR, CT, MRI, DXA, US): Reviewed April 2017 CT abdomen - tiny umbilical hernia    Labs reviewed or ordered (1 TOTAL): None.     Medicine tests reviewed or ordered (ECG, echocardiogram, colonoscopy, EGD, venous US) (1 TOTAL): None.    Independent review of ECG or XR (2 EACH): None.    MEDICATIONS:  No current outpatient prescriptions on file.     No current facility-administered medications for this visit.        The visit lasted a total of 12 minutes face to face with the patient. Over 50% of the time was spent counseling and educating the patient about his pain.    IAmerico, am scribing for and in the presence of, Dr. Paz.    I, Dr. Paz, personally performed the services described in this documentation, as scribed by Americo Chatterjee in my presence, and it is both accurate and complete.    Dragon dictation was used for this note.  Speech recognition errors are a possibility.      Total data points: 3

## 2021-06-20 ENCOUNTER — HOSPITAL ENCOUNTER (EMERGENCY)
Dept: EMERGENCY MEDICINE | Facility: CLINIC | Age: 41
Discharge: HOME | End: 2021-06-20
Payer: COMMERCIAL

## 2021-06-20 DIAGNOSIS — N20.0 NEPHROLITHIASIS: ICD-10-CM

## 2021-06-20 DIAGNOSIS — R10.9 FLANK PAIN: ICD-10-CM

## 2021-06-20 LAB
ALBUMIN UR-MCNC: NEGATIVE G/DL
ANION GAP SERPL CALCULATED.3IONS-SCNC: 10 MMOL/L (ref 5–18)
APPEARANCE UR: CLEAR
BACTERIA #/AREA URNS HPF: ABNORMAL /[HPF]
BILIRUB UR QL STRIP: NEGATIVE
BUN SERPL-MCNC: 16 MG/DL (ref 8–22)
C REACTIVE PROTEIN LHE: <0.1 MG/DL (ref 0–0.8)
CALCIUM SERPL-MCNC: 9.3 MG/DL (ref 8.5–10.5)
CHLORIDE BLD-SCNC: 107 MMOL/L (ref 98–107)
CO2 SERPL-SCNC: 23 MMOL/L (ref 22–31)
COLOR UR AUTO: ABNORMAL
CREAT SERPL-MCNC: 0.82 MG/DL (ref 0.7–1.3)
ERYTHROCYTE [DISTWIDTH] IN BLOOD BY AUTOMATED COUNT: 12.3 % (ref 11–14.5)
GFR SERPL CREATININE-BSD FRML MDRD: >60 ML/MIN/1.73M2
GLUCOSE BLD-MCNC: 120 MG/DL (ref 70–125)
GLUCOSE UR STRIP-MCNC: NEGATIVE MG/DL
HCT VFR BLD AUTO: 46.2 % (ref 40–54)
HGB BLD-MCNC: 15.5 G/DL (ref 14–18)
HGB UR QL STRIP: ABNORMAL
HYALINE CASTS: 5 LPF
KETONES UR STRIP-MCNC: NEGATIVE MG/DL
LEUKOCYTE ESTERASE UR QL STRIP: NEGATIVE
MCH RBC QN AUTO: 28 PG (ref 27–34)
MCHC RBC AUTO-ENTMCNC: 33.5 G/DL (ref 32–36)
MCV RBC AUTO: 84 FL (ref 80–100)
MUCOUS THREADS #/AREA URNS LPF: PRESENT LPF
NITRATE UR QL: NEGATIVE
PH UR STRIP: 5.5 [PH] (ref 5–8)
PLATELET # BLD AUTO: 296 THOU/UL (ref 140–440)
PMV BLD AUTO: 11.1 FL (ref 8.5–12.5)
POTASSIUM BLD-SCNC: 3.5 MMOL/L (ref 3.5–5)
RBC # BLD AUTO: 5.53 MILL/UL (ref 4.4–6.2)
RBC URINE: 23 HPF
SODIUM SERPL-SCNC: 140 MMOL/L (ref 136–145)
SP GR UR STRIP: 1.01 (ref 1–1.03)
SQUAMOUS EPITHELIAL: <1 /HPF
UROBILINOGEN UR STRIP-ACNC: ABNORMAL
WBC URINE: 2 HPF
WBC: 20.3 THOU/UL (ref 4–11)

## 2021-06-21 ENCOUNTER — COMMUNICATION - HEALTHEAST (OUTPATIENT)
Dept: UROLOGY | Facility: CLINIC | Age: 41
End: 2021-06-21

## 2021-06-22 ENCOUNTER — COMMUNICATION - HEALTHEAST (OUTPATIENT)
Dept: UROLOGY | Facility: CLINIC | Age: 41
End: 2021-06-22

## 2021-06-25 NOTE — ED TRIAGE NOTES
Pt reports history of kidney stones and has an appointment at [facility] next week.  States he is here due to running out pain medications.  Pt states he was taking oxycodone.  Pt states pain is more left sided.  Pt reports vomiting and diarrhea.

## 2021-06-25 NOTE — TELEPHONE ENCOUNTER
Attempted to reach patient for follow-up. No answer and voicemail box is full.  Flavia Desouza RN

## 2021-06-25 NOTE — ED TRIAGE NOTES
Patient arrives with c/o LLQ pain that started around 5/30/2021. Reports having a confirmed kidney stone. Reports that he is out of his pain medication.  Reports he has been straining his urine and does not believe he has passed the stone yet.  Reports pain can be bad enough that he passes out (9/10 pain).

## 2021-06-25 NOTE — TELEPHONE ENCOUNTER
Message left for patient to call clinic to set up an appointment for kidney stone follow-up for today or tomorrow.  Flavia Desouza RN

## 2021-06-25 NOTE — ED NOTES
Hourly rounding completed on patient.  Updated on plan of care.  Will continue to monitor.  Call light in reach.

## 2021-06-25 NOTE — ED TRIAGE NOTES
Patient states for last 2 weeks has not fell well, has a history of kidney stones and GB out.  This morning after awakening he had left flank pain radiating into back with nausea and vomiting.

## 2021-06-25 NOTE — ED TRIAGE NOTES
The patient presents to the ED with left flank pain that began this morning. The patient has a history of kidney stones. The patient reports nausea and vomiting. The patient reports taking Tylenol 1 hour ago without relief.

## 2021-06-26 ENCOUNTER — HEALTH MAINTENANCE LETTER (OUTPATIENT)
Age: 41
End: 2021-06-26
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
## 2021-06-26 NOTE — TELEPHONE ENCOUNTER
----- Message from Cathy Sexton RN sent at 5/6/2020  8:21 AM CDT -----  Regarding: FW: PRE-PROCEDURE COVID TEST TODAY AT 9:30- PT REQUESTING CALL BACK    ----- Message -----  From: Candice Beach  Sent: 5/6/2020   8:14 AM CDT  To: Brigette Cole Rn Support Pool  Subject: PRE-PROCEDURE COVID TEST TODAY AT 9:30- PT R#    General phone call:    Caller: Anisha, daughter     Detailed reason for call: Anisha is calling because her mother is getting a MARITA on 5/15/20, and she is getting a COVID test this morning at 9:30. Anisha was wondering if getting the test done today is too soon before the procedure. Anisha was instructed to plan on taking the test today as planned, but to have her phone available. Please call back as soon as you can     Best phone number: [phone] Anisha   Best time to contact: asap  Ok to leave a detailed message? yes  Device? no    Additional Info:           Message left for patient to call clinic to set up an appointment for kidney stone follow-uP.  Flavia Desouza RN
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
## 2021-06-26 NOTE — PROGRESS NOTES
Patient roomed via telephone for a virtual visit.  He is being seen for stone consultation from [facility].  Patient confirmed he is in the [facility] at the time of this appointment.

## 2021-06-26 NOTE — PATIENT INSTRUCTIONS - HE
Patient Stated Goal: Pass my stone  Symptom Control While Passing A Stone    The goal of [facility] is to let a smaller kidney stone (less than 4 to 5 mm) pass without intervention if possible. Giving your body a chance to clear the stone may take a few hours up to a few weeks.  Keeping you well-informed, safe and fairly comfortable is important.    Drink to thirst  Do not attempt to  flush out  your stone by drinking too much fluid. This does not work and may increase nausea. Drink enough to satisfy your body s thirst. Eating your normal diet is fine.   Medications (that may be suggested or prescribed)    Ibuprofen (Advil or Motrin) Available over the counter  o Take two (200mg) tablets every six hours until the stone passes.  o Prevents spasm of the ureter.    o Decreases pain.      Dramamine* (drowsy version, non-generic formulation) Available over the counter  o Take 50mg at bedtime  o Decreases spasms of the ureter  o Decreases nausea  o Decreases acute pain  o Decreases recurrence of pain for 24 hours  o Will help you sleep  *This medication will cause increase drowsiness, do not drive or operate machinery for 6 hours.      Narcotics (Percocet, Vicodin, Dilaudid) Take as prescribed for severe pain unrelieved by ibuprofen and Dramamine  o Narcotics have significant side effects and only  cover-up  pain. They have no effect on the cause of pain.  o Common side effects  - Confusion, disorientation and sedation - DO NOT DRIVE OR OPERATE MACHINERY WITHIN 24 HOURS  - Nausea - take Dramamine or Zofran or Haldol to help control  - Constipation  - Sleep disturbances      Ondansetron (Zofran) Take as prescribed  o Reserve for severe nausea  o May cause constipation, start over the counter Miralax if needed      Second Line Anti-Nausea Medication: Adding a different anti-nausea medication maybe helpful for persistent nausea.  The combined effect of different types of anti-nausea medications maybe more  effective than either medication by itself, even in higher doses.  o Compazine: Take as prescribed      Information about kidney stones    Crystals can form if chemicals are too concentrated in your urine. If the crystal grows over time, a stone may form. A stone usually isn t painful while it is still in the kidney.    As the stone begins to leave the kidney, you may experience episodes of flank pain as the kidney stone approaches the entrance to the ureter. Some people feel a vague ache in the side.    Kidney stones may fall into the ureter. Some stones are tiny and pass through without causing symptoms. The ureter is a small tube (approximately 1/8 of an inch wide). A kidney stone can get stuck and block the ureter. If this happens, urine backs up and flows back to the kidney. Back pressure on the kidney can cause:  o Severe flank pain radiating to the groin.  o Severe nausea and vomiting.  o The pain can occur in the lower back, side, groin or all three.      When the stone reaches the lower ureter, this can irritate the bladder and sensations of feeling the urge to urinate frequently and urgently may occur.      Once the stone passes out of your ureter and into your bladder, the symptoms of urgency and frequency will often disappear. Sometimes pain will come back for a short period and will not be as severe as before. The passage of the stone from your bladder and out of your body is usually not a problem. The urethra is at least twice as wide as the normal ureter, so the stone doesn t usually block it.    Strain all urine  If you pass the stone, save it. Place it in the container we have provided and bring it to the [facility] within a week of passing it. Your stone will then be sent for analysis which takes about a month.     Signs and symptoms you might experience    Nausea    Decreased appetite    Urinary frequency    Bloody urine     Chills    Fatigue    When to call [facility] or  go to the Emergency Room    Fever with a temperature greater than 100.1    Severe pain    Persistent nausea/vomiting    If the pain worsens or nausea/vomiting is uncontrolled with medications, STOP eating & drinking. You need to have an empty stomach for 8 hours prior to surgery. Call the [facility] immediately at [phone].           Follow-up    Low dose CT scan with doctor visit 1-2 weeks after initial clinic visit per doctor s instructions    Please cancel the CT scan visit if you pass a stone. Reschedule for a one month follow-up with doctor to discuss stone composition and future prevention.    Preventing future stones    Approximately a month after your stone is sent out for analysis, a prevention visit will occur with your provider, to discuss an individualized plan for prevention of new stones and to discuss managing stones that you may still have. Along with the analysis of the kidney stone, blood and urine tests may be indicated to develop this plan. Knowing the type of kidney stones you make, and why, allows the providers at the [facility] to recommend specific ways to prevent them.    Follow-up visits are an important part of monitoring and preventing future re-occurrences.    The [facility] is available for questions or concerns 24 hours a day at [phone]
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
## 2021-06-26 NOTE — ED PROVIDER NOTES
eMERGENCY dEPARTMENT PROGRESS NOTE      Patient was signed out to me by Dr. Michael Ureña at 7:01 PM.      Jayda Ragland is a 40 y.o. male with a pertinent history of obesity, low back pain, kidney stones, mood disorder, and s/p cholecystectomy who presents to this ED via walk in for evaluation of flank pain was checked out to my by Dr. Ureña.  The patient is noted to have a 3 mm stone in the left UPJ.  The plan is for discharge home ending the UA results to ensure that he does not have a UTI.    The urinalysis results did not indicate a urinary tract infection.  The patient was discharged home with the previously written discharge instructions including outpatient [facility] follow-up.        I, Milly Rosario, am serving as a scribe to document services personally performed by Dr. Carlos Tai based on my observation and the provider's statements to me. I, Carlos Tai MD attest that Milly Rosario is acting in a scribe capacity, has observed my performance of the services and has documented them in accordance with my direction.     LABS  Pertinent lab results reviewed in chart.  Results for orders placed or performed during the hospital encounter of 05/31/21   HM2(CBC w/o Differential)   Result Value Ref Range    WBC 13.1 (H) 4.0 - 11.0 thou/uL    RBC 5.76 4.40 - 6.20 mill/uL    Hemoglobin 16.4 14.0 - 18.0 g/dL    Hematocrit 49.6 40.0 - 54.0 %    MCV 86 80 - 100 fL    MCH 28.5 27.0 - 34.0 pg    MCHC 33.1 32.0 - 36.0 g/dL    RDW 12.3 11.0 - 14.5 %    Platelets 330 140 - 440 thou/uL    MPV 11.4 8.5 - 12.5 fL   Basic Metabolic Panel   Result Value Ref Range    Sodium 141 136 - 145 mmol/L    Potassium 4.1 3.5 - 5.0 mmol/L    Chloride 103 98 - 107 mmol/L    CO2 25 22 - 31 mmol/L    Anion Gap, Calculation 13 5 - 18 mmol/L    Glucose 127 (H) 70 - 125 mg/dL    Calcium 9.6 8.5 - 10.5 mg/dL    BUN 10 8 - 22 mg/dL    Creatinine 1.17 0.70 - 1.30 mg/dL    GFR MDRD Af Amer >60 >60 mL/min/1.73m2    GFR MDRD Non Af Amer >60 >60  mL/min/1.73m2   Hepatic Profile   Result Value Ref Range    Bilirubin, Total 0.9 0.0 - 1.0 mg/dL    Bilirubin, Direct 0.2 <=0.5 mg/dL    Protein, Total 7.6 6.0 - 8.0 g/dL    Albumin 4.6 3.5 - 5.0 g/dL    Alkaline Phosphatase 108 45 - 120 U/L    AST 14 0 - 40 U/L    ALT 23 0 - 45 U/L   Lipase   Result Value Ref Range    Lipase 27 0 - 52 U/L   Urinalysis-UC if Indicated for patients > 12 years   Result Value Ref Range    Color, UA Light Yellow Light Yellow, Yellow    Clarity, UA Clear Clear    Glucose, UA Negative Negative    Protein, UA 10 mg/dL Negative    Bilirubin, UA Negative Negative    Urobilinogen, UA <2.0 mg/dL <2.0 mg/dL    pH, UA 6.0 5.0 - 8.0    Blood, UA 1.0 mg/dL (!) Negative    Ketones, UA Negative Negative    Nitrite, UA Negative Negative    Leukocytes, UA Negative Negative    Specific Gravity, UA 1.047 (H) 1.001 - 1.030    RBC, UA 92 (H) <=2 hpf    WBC UA 4 <=5 hpf    Bacteria, UA None Seen None Seen    Squamous Epithel, UA <1 <=5 /HPF    Mucus, UA Present (!) None Seen lpf    Ca Oxalate Rhianna, UA Few (!) None Seen   Lactic Acid   Result Value Ref Range    Lactic Acid 2.1 (H) 0.7 - 2.0 mmol/L         RADIOLOGY  Ct Abdomen Pelvis Without Oral With Iv Contrast    Result Date: 5/31/2021  EXAM: CT ABDOMEN PELVIS WO ORAL W IV CONTRAST LOCATION: [facility] DATE/TIME: 5/31/2021 6:05 PM INDICATION: Left lower quadrant abdominal pain, diverticulitis vs colitis vs kidney stone COMPARISON: 04/21/2020 TECHNIQUE: CT scan of the abdomen and pelvis was performed following injection of IV contrast. Multiplanar reformats were obtained. Dose reduction techniques were used. CONTRAST: Iopamidol ([id number]-370) 100mL FINDINGS: LOWER CHEST: Mild subsegmental atelectasis at the right base. HEPATOBILIARY: Prior cholecystectomy. Liver unremarkable. PANCREAS: Normal. SPLEEN: Normal. ADRENAL GLANDS: Normal. KIDNEYS/BLADDER: Moderate left hydronephrosis, subtle perinephric edema, and slightly delayed  nephrogram. There is a 3 mm stone in the proximal left ureter, 2 cm below the ureteropelvic junction. There are nonobstructing 1 mm and 2 mm stones in the lower  pole left kidney. Right kidney appears normal. Urinary bladder appears normal. BOWEL: No free air, free fluid, or inflammatory change.  Normal appendix. No obstruction. Tiny fat-containing umbilical hernia. LYMPH NODES: Normal. VASCULATURE: Unremarkable. PELVIC ORGANS: Normal. MUSCULOSKELETAL: Normal.     1.  Moderate left obstructive uropathy secondary to a 3 mm stone in the proximal left ureter seen 2 cm below the ureteropelvic junction.          ED COURSE & MEDICAL DECISION MAKING    Pertinent Labs and Imagaing reviewed (see chart for details)    7:28 PM I rechecked the patient, updated him with current results and recommendations, and discussed plans for treatment today in the ED. Discharge instructions were given at this time and the patient was advised close out patient follow up. Strict return precautions were given at this time. Patient agreeable.      At the conclusion of the encounter I discussed  the results of all of the tests and the disposition.   The questions were answered.  The patient or family acknowledged understanding and was agreeable with the care plan.              Carlos Tai DO  05/31/21 1941
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
## 2021-07-05 ENCOUNTER — HOSPITAL ENCOUNTER (EMERGENCY)
Dept: EMERGENCY MEDICINE | Facility: CLINIC | Age: 41
Discharge: HOME | End: 2021-07-06
Attending: EMERGENCY MEDICINE
Payer: COMMERCIAL

## 2021-07-05 DIAGNOSIS — K59.00 CONSTIPATION, UNSPECIFIED CONSTIPATION TYPE: ICD-10-CM

## 2021-07-05 DIAGNOSIS — R33.9 URINARY RETENTION: ICD-10-CM

## 2021-07-05 LAB
ALBUMIN UR-MCNC: NEGATIVE G/DL
ANION GAP SERPL CALCULATED.3IONS-SCNC: 8 MMOL/L (ref 5–18)
APPEARANCE UR: CLEAR
BACTERIA #/AREA URNS HPF: ABNORMAL /[HPF]
BILIRUB UR QL STRIP: NEGATIVE
BUN SERPL-MCNC: 13 MG/DL (ref 8–22)
CALCIUM SERPL-MCNC: 8.9 MG/DL (ref 8.5–10.5)
CHLORIDE BLD-SCNC: 106 MMOL/L (ref 98–107)
CO2 SERPL-SCNC: 25 MMOL/L (ref 22–31)
COLOR UR AUTO: YELLOW
CREAT SERPL-MCNC: 0.76 MG/DL (ref 0.7–1.3)
ERYTHROCYTE [DISTWIDTH] IN BLOOD BY AUTOMATED COUNT: 12.4 % (ref 11–14.5)
GFR SERPL CREATININE-BSD FRML MDRD: >60 ML/MIN/1.73M2
GLUCOSE BLD-MCNC: 99 MG/DL (ref 70–125)
GLUCOSE UR STRIP-MCNC: NEGATIVE MG/DL
HCT VFR BLD AUTO: 45.2 % (ref 40–54)
HGB BLD-MCNC: 15.2 G/DL (ref 14–18)
HGB UR QL STRIP: NEGATIVE
KETONES UR STRIP-MCNC: ABNORMAL MG/DL
LEUKOCYTE ESTERASE UR QL STRIP: NEGATIVE
MCH RBC QN AUTO: 28.1 PG (ref 27–34)
MCHC RBC AUTO-ENTMCNC: 33.6 G/DL (ref 32–36)
MCV RBC AUTO: 84 FL (ref 80–100)
MUCOUS THREADS #/AREA URNS LPF: PRESENT LPF
NITRATE UR QL: NEGATIVE
PH UR STRIP: 6 [PH] (ref 5–8)
PLATELET # BLD AUTO: 237 THOU/UL (ref 140–440)
PMV BLD AUTO: 11 FL (ref 8.5–12.5)
POTASSIUM BLD-SCNC: 4.2 MMOL/L (ref 3.5–5)
RBC # BLD AUTO: 5.41 MILL/UL (ref 4.4–6.2)
RBC URINE: 3 HPF
SODIUM SERPL-SCNC: 139 MMOL/L (ref 136–145)
SP GR UR STRIP: 1.02 (ref 1–1.03)
SQUAMOUS EPITHELIAL: 0 /HPF
UROBILINOGEN UR STRIP-ACNC: NORMAL
WBC URINE: 1 HPF
WBC: 6.7 THOU/UL (ref 4–11)

## 2021-07-06 VITALS — BODY MASS INDEX: 31.01 KG/M2 | WEIGHT: 210 LBS

## 2021-07-06 VITALS — HEIGHT: 69 IN | WEIGHT: 207 LBS | BODY MASS INDEX: 30.66 KG/M2

## 2021-07-06 VITALS — BODY MASS INDEX: 29.82 KG/M2 | WEIGHT: 207.8 LBS

## 2021-07-06 VITALS — BODY MASS INDEX: 31.31 KG/M2 | WEIGHT: 212 LBS

## 2021-07-06 VITALS — BODY MASS INDEX: 32.93 KG/M2 | WEIGHT: 230 LBS | HEIGHT: 70 IN

## 2021-07-06 NOTE — ED TRIAGE NOTES
ED Triage Notes by Tere Carrillo RN at 7/5/2021 10:57 PM     Author: Tere Carrillo RN Service: Emergency Medicine Author Type: Registered Nurse    Filed: 7/5/2021 10:58 PM Date of Service: 7/5/2021 10:57 PM Status: Signed    : Tere Carrillo RN (Registered Nurse)       Patient reports that he has had not peed or pooped in two days, has >1159mL in bladder scan in triage.  Reports a lot of pain to lower abdomen.  Tere Carrillo RN.......7/5/2021 10:58 PM
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
## 2021-07-06 NOTE — ED NOTES
ED Notes by Samantha Soriano RN at 7/6/2021  3:38 AM     Author: Samantha Soriano RN Service: -- Author Type: Registered Nurse    Filed: 7/6/2021  3:39 AM Date of Service: 7/6/2021  3:38 AM Status: Signed    : Samantha Soriano RN (Registered Nurse)       Pt discharged home. VSS. IV removed prior to discharge. Discharge AVS and medication discussed with patient. Patient instructed not to drive x3. Pt stated that girlfriend is coming to pick him up. Follow-up care discussed with patient. All questions answered. ...Samantha Soriano RN

## 2021-07-06 NOTE — ED NOTES
ED Notes by Lulú Morales ERT at 7/6/2021  1:45 AM     Author: Lulú Morales ERT Service: -- Author Type: Technologist    Filed: 7/6/2021  1:46 AM Date of Service: 7/6/2021  1:45 AM Status: Signed    : Lulú Morales ERT (Technologist)       Pt to CT
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
## 2021-07-07 ENCOUNTER — HOSPITAL ENCOUNTER (EMERGENCY)
Dept: EMERGENCY MEDICINE | Facility: CLINIC | Age: 41
Discharge: HOME | End: 2021-07-07
Attending: EMERGENCY MEDICINE
Payer: COMMERCIAL

## 2021-07-07 DIAGNOSIS — R33.9 URINARY RETENTION: ICD-10-CM

## 2021-07-07 LAB
ALBUMIN UR-MCNC: NEGATIVE G/DL
AMPHETAMINES UR QL SCN: ABNORMAL
ANION GAP SERPL CALCULATED.3IONS-SCNC: 5 MMOL/L (ref 5–18)
APPEARANCE UR: CLEAR
BARBITURATES UR QL: ABNORMAL
BASOPHILS # BLD AUTO: 0.1 THOU/UL (ref 0–0.2)
BASOPHILS NFR BLD AUTO: 1 % (ref 0–2)
BENZODIAZ UR QL: ABNORMAL
BILIRUB UR QL STRIP: NEGATIVE
BUN SERPL-MCNC: 11 MG/DL (ref 8–22)
CALCIUM SERPL-MCNC: 9.2 MG/DL (ref 8.5–10.5)
CANNABINOIDS UR QL SCN: ABNORMAL
CHLORIDE BLD-SCNC: 106 MMOL/L (ref 98–107)
CO2 SERPL-SCNC: 30 MMOL/L (ref 22–31)
COCAINE UR QL: ABNORMAL
COLOR UR AUTO: NORMAL
CREAT SERPL-MCNC: 0.9 MG/DL (ref 0.7–1.3)
CREAT UR-MCNC: 190.4 MG/DL
EOSINOPHIL # BLD AUTO: 0.3 THOU/UL (ref 0–0.4)
EOSINOPHIL NFR BLD AUTO: 4 % (ref 0–6)
ERYTHROCYTE [DISTWIDTH] IN BLOOD BY AUTOMATED COUNT: 12.5 % (ref 11–14.5)
GFR SERPL CREATININE-BSD FRML MDRD: >60 ML/MIN/1.73M2
GLUCOSE BLD-MCNC: 101 MG/DL (ref 70–125)
GLUCOSE UR STRIP-MCNC: NEGATIVE MG/DL
HCT VFR BLD AUTO: 45.6 % (ref 40–54)
HGB BLD-MCNC: 15.3 G/DL (ref 14–18)
HGB UR QL STRIP: NEGATIVE
IMM GRANULOCYTES # BLD: 0 THOU/UL
IMM GRANULOCYTES NFR BLD: 0 %
KETONES UR STRIP-MCNC: NEGATIVE MG/DL
LEUKOCYTE ESTERASE UR QL STRIP: NEGATIVE
LYMPHOCYTES # BLD AUTO: 1.9 THOU/UL (ref 0.8–4.4)
LYMPHOCYTES NFR BLD AUTO: 25 % (ref 20–40)
MCH RBC QN AUTO: 28.4 PG (ref 27–34)
MCHC RBC AUTO-ENTMCNC: 33.6 G/DL (ref 32–36)
MCV RBC AUTO: 85 FL (ref 80–100)
METHADONE UR QL SCN: ABNORMAL
MONOCYTES # BLD AUTO: 0.5 THOU/UL (ref 0–0.9)
MONOCYTES NFR BLD AUTO: 7 % (ref 2–10)
NEUTROPHILS # BLD AUTO: 5 THOU/UL (ref 2–7.7)
NEUTROPHILS NFR BLD AUTO: 64 % (ref 50–70)
NITRATE UR QL: NEGATIVE
OPIATES UR QL SCN: ABNORMAL
OXYCODONE UR QL: ABNORMAL
PCP UR QL SCN: ABNORMAL
PH UR STRIP: 5.5 [PH] (ref 5–8)
PLATELET # BLD AUTO: 233 THOU/UL (ref 140–440)
PMV BLD AUTO: 10.8 FL (ref 8.5–12.5)
POTASSIUM BLD-SCNC: 4.2 MMOL/L (ref 3.5–5)
RBC # BLD AUTO: 5.38 MILL/UL (ref 4.4–6.2)
SODIUM SERPL-SCNC: 141 MMOL/L (ref 136–145)
SP GR UR STRIP: 1.02 (ref 1–1.03)
UROBILINOGEN UR STRIP-ACNC: NORMAL
WBC: 7.8 THOU/UL (ref 4–11)

## 2021-07-07 NOTE — ED NOTES
ED Notes by Nereyda Cancino RN at 7/7/2021  6:17 AM     Author: Yanko, Nereyda, RN Service: -- Author Type: Registered Nurse    Filed: 7/7/2021  6:18 AM Date of Service: 7/7/2021  6:17 AM Status: Signed    : Nereyda Cancino RN (Registered Nurse)       Pt completing the MRI checklist at this time.

## 2021-07-07 NOTE — ED TRIAGE NOTES
ED Triage Notes by Nereyda Cancino RN at 7/7/2021  5:30 AM     Author: Yanko, Nereyda, RN Service: -- Author Type: Registered Nurse    Filed: 7/7/2021  5:34 AM Date of Service: 7/7/2021  5:30 AM Status: Addendum    : Nereyda Cancino RN (Registered Nurse)    Related Notes: Original Note by Nereyda Cancino RN (Registered Nurse) filed at 7/7/2021  5:31 AM       Pt returns to the ED with a c/o urinary retentions since cath was pulled yesterday at the urology clinic at 1430. Pt had cath placed because he was unable to urinate prior. LBM: 3.5 days ago - also a concern for the pt. IN ED: 650 ml.
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
## 2021-07-07 NOTE — ED PROVIDER NOTES
ED Provider Notes by Cezar Villar DO at 7/7/2021  6:16 AM     Author: Cezar Villar DO Service: -- Author Type: Physician    Filed: 7/7/2021 11:46 AM Date of Service: 7/7/2021  6:16 AM Status: Signed    : Cezar Villar DO (Physician)       ED SIGNOUT  Date/Time:7/7/2021 6:16 AM    Patient signed out to me by my colleague, Veena Fournier MD.  Please see their note for complete history and physical. Plan to follow up on laboratory work and MRI imaging.     The creation of this record is based on the scribes observations of the work being performed by Cezar Villar DO, and the providers statements to them. It was created on their behalf by Leyda Storey, a trained medical scribe. This document has been checked and approved by the attending provider.      REMAINING ED WORKUP:    Vitals:  /65   Pulse (!) 59   Temp 98.2  F (36.8  C) (Oral)   Resp 20   Wt 210 lb (95.3 kg)   SpO2 95%   BMI 31.01 kg/m        Pertinent labs results reviewed   Results for orders placed or performed during the hospital encounter of 07/07/21   Urinalysis-UC if Indicated   Result Value Ref Range    Color, UA Light Yellow Light Yellow, Yellow    Clarity, UA Clear Clear    Glucose, UA Negative Negative    Protein, UA Negative Negative    Bilirubin, UA Negative Negative    Urobilinogen, UA <2.0 mg/dL <2.0 mg/dL    pH, UA 5.5 5.0 - 8.0    Blood, UA Negative Negative    Ketones, UA Negative Negative    Nitrite, UA Negative Negative    Leukocytes, UA Negative Negative    Specific Gravity, UA 1.020 1.001 - 1.030   Basic Metabolic Panel   Result Value Ref Range    Sodium 141 136 - 145 mmol/L    Potassium 4.2 3.5 - 5.0 mmol/L    Chloride 106 98 - 107 mmol/L    CO2 30 22 - 31 mmol/L    Anion Gap, Calculation 5 5 - 18 mmol/L    Glucose 101 70 - 125 mg/dL    Calcium 9.2 8.5 - 10.5 mg/dL    BUN 11 8 - 22 mg/dL    Creatinine 0.90 0.70 - 1.30 mg/dL    GFR MDRD Af Amer >60 >60 mL/min/1.73m2    GFR MDRD Non Af Amer >60 >60  mL/min/1.73m2   HM1 (CBC with Diff)   Result Value Ref Range    WBC 7.8 4.0 - 11.0 thou/uL    RBC 5.38 4.40 - 6.20 mill/uL    Hemoglobin 15.3 14.0 - 18.0 g/dL    Hematocrit 45.6 40.0 - 54.0 %    MCV 85 80 - 100 fL    MCH 28.4 27.0 - 34.0 pg    MCHC 33.6 32.0 - 36.0 g/dL    RDW 12.5 11.0 - 14.5 %    Platelets 233 140 - 440 thou/uL    MPV 10.8 8.5 - 12.5 fL    Neutrophils % 64 50 - 70 %    Lymphocytes % 25 20 - 40 %    Monocytes % 7 2 - 10 %    Eosinophils % 4 0 - 6 %    Basophils % 1 0 - 2 %    Immature Granulocyte % 0 <=0 %    Neutrophils Absolute 5.0 2.0 - 7.7 thou/uL    Lymphocytes Absolute 1.9 0.8 - 4.4 thou/uL    Monocytes Absolute 0.5 0.0 - 0.9 thou/uL    Eosinophils Absolute 0.3 0.0 - 0.4 thou/uL    Basophils Absolute 0.1 0.0 - 0.2 thou/uL    Immature Granulocyte Absolute 0.0 <=0.0 thou/uL   Drug Abuse 1+, Urine   Result Value Ref Range    Amphetamines Screen Negative Screen Negative    Benzodiazepines Screen Negative Screen Negative    Opiates (!) Screen Negative     Screen Positive (Confirmation available on request)    Phencyclidine Screen Negative Screen Negative    THC Screen Negative Screen Negative    Barbiturates Screen Negative Screen Negative    Cocaine Metabolite Screen Negative Screen Negative    Methadone Screen Negative Screen Negative    Oxycodone (!) Screen Negative     Screen Positive (Confirmation available on request)    Creatinine, Urine 190.4 mg/dL       Pertinent imaging reviewed   Please see official radiology report.  MR Lumbar Spine Without Contrast   Final Result   1.  Unremarkable cord and cauda equina nerve roots.   2.  No spinal canal stenosis.   3.  No high-grade foraminal stenosis.   4.  Mildly motion-limited examination.      MR Thoracic Spine Without Contrast   Final Result   1.  Mild spinal canal narrowing and mild narrowing of the right lateral recess at T7-T8.   2.  Mild narrowing of the right lateral recess at T8-T9.   3.  No abnormal cord signal.   4.  No high-grade  neuroforaminal narrowing.   5.  Multiple chronic compression fractures as detailed above.           Interventions  Medications   ketorolac injection 30 mg (TORADOL) (30 mg Intravenous Given 7/7/21 0605)        ED Course/MDM:  6:16 AM Signout accepted from Dr. Fournier.  Prior records were reviewed.  Diagnostics from this visit are reviewed.  8:49 AM I introduced myself to the patient. PPE worn: surgical mask. Discussed the imaging results. We discussed the plan for discharge and the patient is agreeable. Reviewed supportive cares, symptomatic treatment, outpatient follow up, and reasons to return to the Emergency Department. Patient to be discharged by ED RN.       40-year-old male presenting to the emergency department with urinary retention, turned over to me by Dr. Fournier at 6:30 AM for follow-up of MRI imaging, if negative plan was for discharge home with outpatient follow-up with urology.  Patient's MRI does show some narrowing in the T-spine but of unlikely clinical significance to his symptoms today as there is no evidence of compression of the spinal cord that would result in cauda equina.  Therefore at this time, I do believe he can be safely discharged home with outpatient follow-up with urology.  Guardado catheter was left in place at discharge.  Return precautions were discussed.        1. Urinary retention          Cezar Villar DO  [facility] Emergency Department     I, Leyda Storey, am serving as a scribe to document services personally performed by Cezar Villar DO, based on my observations and the provider's statements to me.  I, Cezar Villar DO, attest that Leyda Storey is acting in a scribe capacity, has observed my performance of the services and has documented them in accordance with my direction.      Cezar Villar DO  07/07/21 1146

## 2021-07-10 VITALS — WEIGHT: 210 LBS | BODY MASS INDEX: 31.01 KG/M2

## 2021-08-26 ENCOUNTER — HOSPITAL ENCOUNTER (EMERGENCY)
Facility: CLINIC | Age: 41
Discharge: LEFT BEFORE DISCHARGE | End: 2021-08-26
Payer: COMMERCIAL

## 2021-08-26 ENCOUNTER — HOSPITAL ENCOUNTER (EMERGENCY)
Facility: CLINIC | Age: 41
Discharge: LEFT BEFORE DISCHARGE | End: 2021-08-26
Payer: COMMERCIAL

## 2021-08-26 ENCOUNTER — NURSE TRIAGE (OUTPATIENT)
Dept: NURSING | Facility: CLINIC | Age: 41
End: 2021-08-26

## 2021-08-26 ENCOUNTER — HOSPITAL ENCOUNTER (EMERGENCY)
Facility: CLINIC | Age: 41
Discharge: HOME | End: 2021-08-26
Attending: FAMILY MEDICINE | Admitting: FAMILY MEDICINE
Payer: COMMERCIAL

## 2021-08-26 VITALS
OXYGEN SATURATION: 97 % | HEART RATE: 87 BPM | SYSTOLIC BLOOD PRESSURE: 129 MMHG | TEMPERATURE: 98.3 F | WEIGHT: 210 LBS | RESPIRATION RATE: 16 BRPM | BODY MASS INDEX: 30.06 KG/M2 | HEIGHT: 70 IN | DIASTOLIC BLOOD PRESSURE: 73 MMHG

## 2021-08-26 VITALS
TEMPERATURE: 97.1 F | DIASTOLIC BLOOD PRESSURE: 81 MMHG | RESPIRATION RATE: 16 BRPM | OXYGEN SATURATION: 97 % | HEIGHT: 70 IN | SYSTOLIC BLOOD PRESSURE: 137 MMHG | WEIGHT: 210 LBS | HEART RATE: 75 BPM | BODY MASS INDEX: 30.06 KG/M2

## 2021-08-26 DIAGNOSIS — R10.2 PELVIC PAIN IN MALE: ICD-10-CM

## 2021-08-26 DIAGNOSIS — R39.9 COMPLAINT ABOUT URINARY CATHETER: ICD-10-CM

## 2021-08-26 DIAGNOSIS — Z97.8 COMPLAINT ABOUT URINARY CATHETER: ICD-10-CM

## 2021-08-26 LAB
ANION GAP SERPL CALCULATED.3IONS-SCNC: 10 MMOL/L (ref 5–18)
BUN SERPL-MCNC: 18 MG/DL (ref 8–22)
CALCIUM SERPL-MCNC: 9.4 MG/DL (ref 8.5–10.5)
CHLORIDE BLD-SCNC: 105 MMOL/L (ref 98–107)
CO2 SERPL-SCNC: 23 MMOL/L (ref 22–31)
CREAT SERPL-MCNC: 0.79 MG/DL (ref 0.7–1.3)
GFR SERPL CREATININE-BSD FRML MDRD: >90 ML/MIN/1.73M2
GLUCOSE BLD-MCNC: 112 MG/DL (ref 70–125)
POTASSIUM BLD-SCNC: 4.2 MMOL/L (ref 3.5–5)
SODIUM SERPL-SCNC: 138 MMOL/L (ref 136–145)

## 2021-08-26 PROCEDURE — 80048 BASIC METABOLIC PNL TOTAL CA: CPT | Performed by: FAMILY MEDICINE

## 2021-08-26 PROCEDURE — 250N000013 HC RX MED GY IP 250 OP 250 PS 637: Performed by: FAMILY MEDICINE

## 2021-08-26 PROCEDURE — 51702 INSERT TEMP BLADDER CATH: CPT

## 2021-08-26 PROCEDURE — 99283 EMERGENCY DEPT VISIT LOW MDM: CPT

## 2021-08-26 PROCEDURE — 250N000009 HC RX 250: Performed by: FAMILY MEDICINE

## 2021-08-26 PROCEDURE — 36415 COLL VENOUS BLD VENIPUNCTURE: CPT | Performed by: FAMILY MEDICINE

## 2021-08-26 RX ORDER — PHENAZOPYRIDINE HYDROCHLORIDE 100 MG/1
100 TABLET, FILM COATED ORAL ONCE
Status: COMPLETED | OUTPATIENT
Start: 2021-08-26 | End: 2021-08-26

## 2021-08-26 RX ORDER — PHENAZOPYRIDINE HYDROCHLORIDE 200 MG/1
200 TABLET, FILM COATED ORAL 3 TIMES DAILY
Qty: 9 TABLET | Refills: 0 | Status: SHIPPED | OUTPATIENT
Start: 2021-08-26 | End: 2021-08-29

## 2021-08-26 RX ORDER — BACLOFEN 10 MG/1
5 TABLET ORAL ONCE
Status: COMPLETED | OUTPATIENT
Start: 2021-08-26 | End: 2021-08-26

## 2021-08-26 RX ORDER — CIPROFLOXACIN 500 MG/1
500 TABLET, FILM COATED ORAL 2 TIMES DAILY
Qty: 6 TABLET | Refills: 0 | Status: SHIPPED | OUTPATIENT
Start: 2021-08-26 | End: 2021-08-29

## 2021-08-26 RX ORDER — LIDOCAINE HYDROCHLORIDE 20 MG/ML
10 JELLY TOPICAL ONCE
Status: COMPLETED | OUTPATIENT
Start: 2021-08-26 | End: 2021-08-26

## 2021-08-26 RX ORDER — OXYBUTYNIN CHLORIDE 5 MG/1
5 TABLET ORAL 3 TIMES DAILY
Qty: 9 TABLET | Refills: 0 | Status: SHIPPED | OUTPATIENT
Start: 2021-08-26

## 2021-08-26 RX ADMIN — PHENAZOPYRIDINE 100 MG: 100 TABLET ORAL at 07:41

## 2021-08-26 RX ADMIN — BACLOFEN 5 MG: 10 TABLET ORAL at 07:41

## 2021-08-26 RX ADMIN — LIDOCAINE HYDROCHLORIDE 10 ML: 20 JELLY TOPICAL at 09:35

## 2021-08-26 ASSESSMENT — ENCOUNTER SYMPTOMS
ABDOMINAL PAIN: 1
SLEEP DISTURBANCE: 1
RECTAL PAIN: 1
CONSTIPATION: 1
FEVER: 0

## 2021-08-26 ASSESSMENT — MIFFLIN-ST. JEOR
SCORE: 1868.8
SCORE: 1868.8

## 2021-08-26 NOTE — TELEPHONE ENCOUNTER
Triage note:    Arleen called to report patient is in a lot of pain. Patient gave consent to talk with arleen. Patient states he has a catheter and is experiencing severe abdominal pain. Patient  described pain as boiling burning feeling to his penis about 20 to 30 times today. Patient states he would like his surgery sooner and to talk about stomach catheter.    Per protocol patient to go to ED now. Given home care advice per protocol.Patient verbalized understanding and agrees to plan of care.    Eva Guzmán RN   08/26/21 3:06 AM  [facility] Nurse Advisor    COVID 19 Nurse Triage Plan/Patient Instructions    Please be aware that novel coronavirus (COVID-19) may be circulating in the community. If you develop symptoms such as fever, cough, or SOB or if you have concerns about the presence of another infection including coronavirus (COVID-19), please contact your health care provider or visit [email or web address].     Disposition/Instructions    ED Visit recommended. Follow protocol based instructions.     Bring Your Own Device:  Please also bring your smart device(s) (smart phones, tablets, laptops) and their charging cables for your personal use and to communicate with your care team during your visit.    Thank you for taking steps to prevent the spread of this virus.  o Limit your contact with others.  o Wear a simple mask to cover your cough.  o Wash your hands well and often.    Resources    [facility]: About COVID-19: [email or web address]    CDC: What to Do If You're Sick: www.cdc.gov/coronavirus/2019-ncov/about/steps-when-sick.html    CDC: Ending Home Isolation: www.cdc.gov/coronavirus/2019-ncov/hcp/disposition-in-home-patients.html     CDC: Caring for Someone: www.cdc.gov/coronavirus/2019-ncov/if-you-are-sick/care-for-someone.html     [facility]: Interim Guidance for Hospital Discharge to Home: www.health.[facility].[place]/diseases/coronavirus/hcp/hospdischarge.pdf    [facility]  [place] clinical trials (COVID-19 research studies): [email or web address]-clinical-trials     Below are the COVID-19 hotlines at the [place] Department of Health ([facility]). Interpreters are available.   o For health questions: Call 461-641-0877 or 1-483.134.4779 (7 a.m. to 7 p.m.)  o For questions about schools and childcare: Call 666-897-8499 or 1-972.674.3037 (7 a.m. to 7 p.m.)                     Reason for Disposition    SEVERE abdominal pain    Additional Information    Negative: Shock suspected (e.g., cold/pale/clammy skin, too weak to stand, low BP, rapid pulse)    Negative: Sounds like a life-threatening emergency to the triager    Negative: [1] Catheter was accidentally pulled-out AND [2] bright red continuous bleeding    Protocols used: URINARY CATHETER SYMPTOMS AND CALTMRERK-Y-QS
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
## 2021-08-26 NOTE — ED TRIAGE NOTES
Pt here with complaints of lower pelvic pain/ burning for the past few weeks on and off with increased constant pain that started today. Pt has an indwelling catheter and states drainage has been cloudy the past few weeks. Pt is rating pain 10/10 on verbal pain scale.

## 2021-09-01 ENCOUNTER — HOSPITAL ENCOUNTER (EMERGENCY)
Facility: CLINIC | Age: 41
Discharge: HOME | End: 2021-09-01
Attending: EMERGENCY MEDICINE | Admitting: EMERGENCY MEDICINE
Payer: COMMERCIAL

## 2021-09-01 VITALS
WEIGHT: 210 LBS | DIASTOLIC BLOOD PRESSURE: 77 MMHG | SYSTOLIC BLOOD PRESSURE: 123 MMHG | HEART RATE: 65 BPM | TEMPERATURE: 97.7 F | OXYGEN SATURATION: 98 % | RESPIRATION RATE: 18 BRPM | HEIGHT: 70 IN | BODY MASS INDEX: 30.06 KG/M2

## 2021-09-01 DIAGNOSIS — Z97.8 FOLEY CATHETER PRESENT: ICD-10-CM

## 2021-09-01 DIAGNOSIS — N32.89 BLADDER SPASM: ICD-10-CM

## 2021-09-01 PROCEDURE — 99282 EMERGENCY DEPT VISIT SF MDM: CPT

## 2021-09-01 ASSESSMENT — ENCOUNTER SYMPTOMS
RHINORRHEA: 0
NAUSEA: 1
COUGH: 0
SHORTNESS OF BREATH: 0
SORE THROAT: 0
CHILLS: 0
VOMITING: 1
FEVER: 0
LIGHT-HEADEDNESS: 1
DYSURIA: 1
DIAPHORESIS: 1
DIARRHEA: 0

## 2021-09-01 ASSESSMENT — MIFFLIN-ST. JEOR: SCORE: 1868.8
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
## 2021-09-01 NOTE — DISCHARGE INSTRUCTIONS
Read and follow the discharge instructions.    Having bladder spasms.  Make sure you take the Ditropan as instructed.    It is very important that you take all the antibiotics so you can undergo your suprapubic catheter placement    Call today to see when this is scheduled.    You could take ibuprofen as needed for spasms inflammation    You could also use a heating pad that was given to you.    Call your primary care doctor and urologist today for reevaluation    Return for worsening symptoms.

## 2021-09-01 NOTE — ED TRIAGE NOTES
Groin and penile pain started around 0400. Pt had gaspar catheter placed 4 days ago for urinary retention. Gaspar catheter in place with adequate output. Lin in color. Pt states he has a burning sensation every time he drinks something and then tries to urinate. Pt being treated for UTI.

## 2021-09-02 ENCOUNTER — PATIENT OUTREACH (OUTPATIENT)
Dept: CARE COORDINATION | Facility: CLINIC | Age: 41
End: 2021-09-02

## 2021-09-02 DIAGNOSIS — Z71.89 OTHER SPECIFIED COUNSELING: ICD-10-CM

## 2021-09-02 NOTE — PROGRESS NOTES
Clinic Care Coordination Contact  [facility]/Voicemail       Clinical Data: Care Coordinator Outreach  Outreach attempted x 1. The CHW was not able to leave a voicemail for the patient due to the voicemail being full.  Plan: Care Coordinator will try to reach patient again in 1-2 business days.    ANA Hines  [phone]  [facility]

## 2021-09-03 NOTE — PROGRESS NOTES
Clinic Care Coordination Contact  [facility]/Voicemail       Clinical Data: Care Coordinator Outreach  Outreach attempted x 2.  Left message on patient's voicemail with call back information and requested return call.  Plan: Care Coordinator will do no further outreaches at this time.    ANA Hines  [phone]  [facility] Resource [facility]

## 2021-10-16 ENCOUNTER — HEALTH MAINTENANCE LETTER (OUTPATIENT)
Age: 41
End: 2021-10-16

## 2022-07-23 ENCOUNTER — HEALTH MAINTENANCE LETTER (OUTPATIENT)
Age: 42
End: 2022-07-23

## 2022-10-01 ENCOUNTER — HEALTH MAINTENANCE LETTER (OUTPATIENT)
Age: 42
End: 2022-10-01

## 2023-08-06 ENCOUNTER — HEALTH MAINTENANCE LETTER (OUTPATIENT)
Age: 43
End: 2023-08-06